# Patient Record
Sex: MALE | Race: WHITE
[De-identification: names, ages, dates, MRNs, and addresses within clinical notes are randomized per-mention and may not be internally consistent; named-entity substitution may affect disease eponyms.]

---

## 2019-06-09 ENCOUNTER — HOSPITAL ENCOUNTER (EMERGENCY)
Dept: HOSPITAL 26 - MED | Age: 44
Discharge: HOME | End: 2019-06-09
Payer: MEDICAID

## 2019-06-09 VITALS — DIASTOLIC BLOOD PRESSURE: 97 MMHG | SYSTOLIC BLOOD PRESSURE: 163 MMHG

## 2019-06-09 VITALS — BODY MASS INDEX: 32.38 KG/M2 | HEIGHT: 66 IN | WEIGHT: 201.5 LBS
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
## 2019-06-09 DIAGNOSIS — S61.411A: Primary | ICD-10-CM
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
## 2019-06-09 PROCEDURE — 12001 RPR S/N/AX/GEN/TRNK 2.5CM/<: CPT

## 2019-06-09 PROCEDURE — 99283 EMERGENCY DEPT VISIT LOW MDM: CPT

## 2019-06-09 NOTE — NUR
C/O LACERATION APPROX. 3CM TO BASE OF R THUMB. PT STATES HE WAS CHANGING A PART 
ON HIS CAR AND GOT CUT IN THE PROCESS. BLEEDING IS CONTROLLED, M/S FUNCTION 
INTACT, <3 SEC CAP REFIL. LAST TETNUS SHOT 7/20/18 HERE AT [facility]

## 2019-06-09 NOTE — NUR
Patient discharged with v/s stable. Written and verbal after care instructions 
given and explained. 

Patient verbalized understanding. Ambulatory with steady gait. All questions 
addressed prior to discharge. Advised to follow up with PMD. PRESCRIPTION OF 
MOTRIN & BACTRIM GIVEN